# Patient Record
Sex: FEMALE | Employment: FULL TIME | URBAN - METROPOLITAN AREA
[De-identification: names, ages, dates, MRNs, and addresses within clinical notes are randomized per-mention and may not be internally consistent; named-entity substitution may affect disease eponyms.]

---

## 2020-11-13 ENCOUNTER — OFFICE VISIT (OUTPATIENT)
Dept: FAMILY MEDICINE CLINIC | Facility: CLINIC | Age: 20
End: 2020-11-13
Payer: OTHER GOVERNMENT

## 2020-11-13 VITALS
WEIGHT: 178.19 LBS | OXYGEN SATURATION: 100 % | HEIGHT: 64 IN | DIASTOLIC BLOOD PRESSURE: 87 MMHG | HEART RATE: 84 BPM | SYSTOLIC BLOOD PRESSURE: 131 MMHG | RESPIRATION RATE: 20 BRPM | BODY MASS INDEX: 30.42 KG/M2 | TEMPERATURE: 99 F

## 2020-11-13 DIAGNOSIS — Z20.822 EXPOSURE TO COVID-19 VIRUS: ICD-10-CM

## 2020-11-13 DIAGNOSIS — Z91.89 AT INCREASED RISK OF EXPOSURE TO COVID-19 VIRUS: Primary | ICD-10-CM

## 2020-11-13 DIAGNOSIS — J01.00 ACUTE MAXILLARY SINUSITIS, RECURRENCE NOT SPECIFIED: ICD-10-CM

## 2020-11-13 PROCEDURE — 99203 OFFICE O/P NEW LOW 30 MIN: CPT | Performed by: NURSE PRACTITIONER

## 2020-11-13 RX ORDER — ALBUTEROL SULFATE 90 UG/1
2 AEROSOL, METERED RESPIRATORY (INHALATION) EVERY 6 HOURS PRN
Qty: 1 INHALER | Refills: 0 | Status: SHIPPED | OUTPATIENT
Start: 2020-11-13 | End: 2020-11-23

## 2020-11-13 RX ORDER — AMOXICILLIN AND CLAVULANATE POTASSIUM 875; 125 MG/1; MG/1
1 TABLET, FILM COATED ORAL 2 TIMES DAILY
Qty: 20 TABLET | Refills: 0 | Status: SHIPPED | OUTPATIENT
Start: 2020-11-13 | End: 2020-11-23

## 2020-11-13 NOTE — PATIENT INSTRUCTIONS
Sinusitis (Antibiotic Treatment)    The sinuses are air-filled spaces within the bones of the face. They connect to the inside of the nose. Sinusitis is an inflammation of the tissue that lines the sinuses. Sinusitis can occur during a cold.  It can also · You can use an OTC decongestant, unless a similar medicine was prescribed to you. Nasal sprays work the fastest. Use one that contains phenylephrine or oxymetazoline. First blow your nose gently. Then use the spray.  Don't use these medicines more often t Call 911 if any of these occur:   · Seizure  · Trouble breathing  · Feeling dizzy or faint  · Fingernails, skin or lips look blue, purple , or gray  Prevention  Here are steps you can take to help prevent an infection:   · Keep good hand washing habits.   · Public health officials are working to find the source. How the virus spreads is not yet fully understood, but it seems to spread and infect people fairly easily.  Some people who have been infected in an area may not be sure how or where they were infected As experts learn more about FAHUH-12, other complications are being reported that may be linked to COVID-19. Rarely, some children have developed severe complications called multisystem inflammatory syndrome in children (MIS-C).  MIS-C seems to be similar t · Viral test.  Viral tests tell if you have a current COVID-19 infection. A nose-throat swab may be wiped inside your nose to the back of your throat. Or a sample of your saliva may be taken.  Either of these samples will be checked for the SARS-CoV-2 virus There is currently no medicine proven to prevent or treat the virus. Some experimental medicines are being tested for COVID-19.  Other medicines used to treat other conditions are being looked at for COVID-19, but these are not currently approved to treat i People who have had COVID-19 and are fully recovered may be asked by their healthcare team to consider donating plasma. This is called COVID-19 convalescent plasma donation.  Plasma from people fully recovered from COVID-19 may contain antibodies to help fi

## 2020-11-13 NOTE — PROGRESS NOTES
Ute Flores is a 23year old female who presents with ill symptoms for  5  days. Patient reports sore throat, congestion, sinus pressure, mild cough worse at night. Denies SOB or CP, denies diarrhea, complains of headache.  Has tried Robitissin cold and si -     Albuterol Sulfate HFA (VENTOLIN HFA) 108 (90 Base) MCG/ACT Inhalation Aero Soln; Inhale 2 puffs into the lungs every 6 (six) hours as needed for Wheezing.     Acute maxillary sinusitis, recurrence not specified  -     Amoxicillin-Pot Clavulanate 875-1 · An expectorant with guaifenesin may help thin nasal mucus and help your sinuses drain fluids. Talk with your provider or pharmacists before taking an over-the-counter (OTC) medicine if you have any questions about it or its side effects. .  · You can use · Swelling of your forehead or eyelids  · Symptoms don't go away in 10 days  · Vision problems, such as blurred or double vision  · Fever of 100.4ºF (38ºC) or higher, or as directed by your healthcare provider  Call 911  Call 911 if any of these occur:   · Public health officials are working to find the source. How the virus spreads is not yet fully understood, but it seems to spread and infect people fairly easily.  Some people who have been infected in an area may not be sure how or where they were infected As experts learn more about DKPTR-13, other complications are being reported that may be linked to COVID-19. Rarely, some children have developed severe complications called multisystem inflammatory syndrome in children (MIS-C).  MIS-C seems to be similar t · Viral test.  Viral tests tell if you have a current COVID-19 infection. A nose-throat swab may be wiped inside your nose to the back of your throat. Or a sample of your saliva may be taken.  Either of these samples will be checked for the SARS-CoV-2 virus There is currently no medicine proven to prevent or treat the virus. Some experimental medicines are being tested for COVID-19.  Other medicines used to treat other conditions are being looked at for COVID-19, but these are not currently approved to treat i People who have had COVID-19 and are fully recovered may be asked by their healthcare team to consider donating plasma. This is called COVID-19 convalescent plasma donation.  Plasma from people fully recovered from COVID-19 may contain antibodies to help fi

## 2021-04-13 ENCOUNTER — OFFICE VISIT (OUTPATIENT)
Dept: FAMILY MEDICINE CLINIC | Facility: CLINIC | Age: 21
End: 2021-04-13

## 2021-04-13 VITALS
RESPIRATION RATE: 18 BRPM | DIASTOLIC BLOOD PRESSURE: 80 MMHG | SYSTOLIC BLOOD PRESSURE: 129 MMHG | WEIGHT: 175 LBS | OXYGEN SATURATION: 100 % | TEMPERATURE: 97 F | BODY MASS INDEX: 31.01 KG/M2 | HEIGHT: 63 IN | HEART RATE: 91 BPM

## 2021-04-13 DIAGNOSIS — J01.00 ACUTE MAXILLARY SINUSITIS, RECURRENCE NOT SPECIFIED: Primary | ICD-10-CM

## 2021-04-13 PROCEDURE — 3079F DIAST BP 80-89 MM HG: CPT | Performed by: NURSE PRACTITIONER

## 2021-04-13 PROCEDURE — 3008F BODY MASS INDEX DOCD: CPT | Performed by: NURSE PRACTITIONER

## 2021-04-13 PROCEDURE — 99213 OFFICE O/P EST LOW 20 MIN: CPT | Performed by: NURSE PRACTITIONER

## 2021-04-13 PROCEDURE — 3074F SYST BP LT 130 MM HG: CPT | Performed by: NURSE PRACTITIONER

## 2021-04-13 RX ORDER — AMOXICILLIN AND CLAVULANATE POTASSIUM 875; 125 MG/1; MG/1
1 TABLET, FILM COATED ORAL 2 TIMES DAILY
Qty: 20 TABLET | Refills: 0 | Status: SHIPPED | OUTPATIENT
Start: 2021-04-13 | End: 2021-04-23

## 2021-04-13 NOTE — PROGRESS NOTES
HPI:   Rochelle Lazo is a 23year old female who presents with ill symptoms for  1  weeks. Patient reports headache not relieved by Tylenol, sinus pressure, mild diarrhea yesterday. Has tried Tylenol with not much relief.  Positive covid history, completed b immediate care if symptoms worsen. The patient indicates understanding of these issues and agrees to the plan. Patient Instructions     Sinusitis (Antibiotic Treatment)    The sinuses are air-filled spaces within the bones of the face.  They connect to th with a fever. It may cause severe liver damage. · Don't smoke. This can make symptoms worse. Follow-up care  Follow up with your healthcare provider, or as advised.    When to seek medical advice  Call your healthcare provider if any of these occur:   ·

## 2021-04-13 NOTE — PATIENT INSTRUCTIONS
Sinusitis (Antibiotic Treatment)    The sinuses are air-filled spaces within the bones of the face. They connect to the inside of the nose. Sinusitis is an inflammation of the tissue that lines the sinuses. Sinusitis can occur during a cold.  It can also healthcare provider, or as advised.    When to seek medical advice  Call your healthcare provider if any of these occur:   · Facial pain or headache that gets worse  · Stiff neck  · Unusual drowsiness or confusion  · Swelling of your forehead or eyelids  ·

## 2021-11-17 ENCOUNTER — OFFICE VISIT (OUTPATIENT)
Dept: FAMILY MEDICINE CLINIC | Facility: CLINIC | Age: 21
End: 2021-11-17
Payer: COMMERCIAL

## 2021-11-17 VITALS
SYSTOLIC BLOOD PRESSURE: 126 MMHG | BODY MASS INDEX: 30.99 KG/M2 | RESPIRATION RATE: 18 BRPM | HEIGHT: 65 IN | DIASTOLIC BLOOD PRESSURE: 82 MMHG | TEMPERATURE: 99 F | WEIGHT: 186 LBS | HEART RATE: 105 BPM | OXYGEN SATURATION: 96 %

## 2021-11-17 DIAGNOSIS — J45.20 MILD INTERMITTENT ASTHMA WITHOUT COMPLICATION: Primary | ICD-10-CM

## 2021-11-17 DIAGNOSIS — Z20.822 ENCOUNTER FOR LABORATORY TESTING FOR COVID-19 VIRUS: ICD-10-CM

## 2021-11-17 PROCEDURE — 3074F SYST BP LT 130 MM HG: CPT | Performed by: NURSE PRACTITIONER

## 2021-11-17 PROCEDURE — 99213 OFFICE O/P EST LOW 20 MIN: CPT | Performed by: NURSE PRACTITIONER

## 2021-11-17 PROCEDURE — 3008F BODY MASS INDEX DOCD: CPT | Performed by: NURSE PRACTITIONER

## 2021-11-17 PROCEDURE — 3079F DIAST BP 80-89 MM HG: CPT | Performed by: NURSE PRACTITIONER

## 2021-11-17 RX ORDER — ALBUTEROL SULFATE 90 UG/1
2 AEROSOL, METERED RESPIRATORY (INHALATION) EVERY 4 HOURS PRN
Qty: 1 EACH | Refills: 0 | Status: SHIPPED | OUTPATIENT
Start: 2021-11-17 | End: 2021-12-01

## 2021-11-17 NOTE — PROGRESS NOTES
CHIEF COMPLAINT:   Patient presents with:  Cough: x 1 month        HPI:   Krystal Greer is a 19year old female who presents for cough for  1 month. Cough is described as dry and has been on and off for about a month. Reports occasional wheezing.   Patient asthma without complication  (primary encounter diagnosis)  Encounter for laboratory testing for covid-19 virus    PLAN:  Meds as below. Comfort Care as listed in Patient Instructions. COVID swab sent. Advised to establish care with PCP.     Meds & Refill Medicines       · Strong odors from perfumes, , cooking, paints, and varnishes  · Chemicals (gases, fumes)  · Air pollution  · Changing weather (temperature, barometric pressure, humidity, and strong winds)  · Smoke (tobacco-inhaled or secondhand) your healthcare provider. Watching for symptoms and taking early, appropriate action is a key part of asthma care. So is knowing what to do if symptoms get worse.  Experts advise making an Asthma Action Plan with your provider and educating your family an the body called acetylcholine. This chemical contracts the muscles. It also causes more mucus in the airways. Inhalation devices for asthma  Inhaled medicines go right to the lungs. They have fewer side effects than medicines taken by mouth.  Inhaled medic inside.   · Pets. Pets that have fur or feathers often cause allergies. If you have pets, try not to touch them. If you do pet or handle them, wash your hands afterward. Keep pets off your furniture, bed, and out of your bedroom.  Have someone brush and bat and heartburn  · Being overweight  · Sleep apnea  · Depression    Work with your healthcare provider to treat any of these problems. Strong emotions  The strong feelings that go with laughing and crying can trigger asthma symptoms.  You can learn how to b Always follow your healthcare professional's instructions.

## 2021-11-17 NOTE — PATIENT INSTRUCTIONS
Asthma  What is asthma? Asthma is a long-term (chronic) ? lung disease. The airways react to triggers (allergens and irritants). This makes it hard to breathe.  With exposure to triggers, changes can occur such as:   · The airways become swollen and infla smoke  · Children with a family history of asthma  · Children who have allergies or atopic dermatitis  · Children exposed to secondhand and tobacco smoke  · Living in areas with high levels of environmental air pollution  What are the symptoms of asthma? provider should regularly check and adjust your medicines as needed. Long-term control medicines  At first, it may take a few weeks for long-term control medicines to work. You must take these medicines every day.  These medicines include:   · Anti-inflam mask is connected to a machine by plastic tubing to deliver medicine. · Dry powder or rotary inhaler.  These inhalers deliver powered medicine as you breathe. Living with asthma  Staying away from triggers is key in managing asthma.  Triggers are specific down after. Ask your provider about using your quick-relief medicine before starting exercise. Keep a log of what types of exercise trigger asthma problems and talk with your provider about possible ways to manage your symptoms.    Irritants  If you smoke, symptoms. Next steps  Tips to help you get the most from a visit to your healthcare provider:  · Know the reason for your visit and what you want to happen. · Before your visit, write down questions you want answered.   · Bring someone with you to help

## (undated) NOTE — LETTER
Date: 11/13/2020    Patient Name: Arin Schuster          To Whom it may concern: The above patient was seen at the [facility] for treatment of a medical condition.     Due to the recent COVID-19 outbreak, CDC guidelines recommend you stay ho

## (undated) NOTE — LETTER
Date: 11/17/2021    Patient Name: Lisa Khoury          To Whom it may concern: This letter has been written at the patient's request. The above patient was seen at the [facility] for treatment of a medical condition.     The patient has a

## (undated) NOTE — LETTER
Date: 11/13/2020    Patient Name: Kwabena Hammond          To Whom it may concern: The above patient was seen at the [facility] for treatment of a medical condition.     Due to the recent COVID-19 outbreak, CDC guidelines recommend you stay ho